# Patient Record
Sex: FEMALE | Race: WHITE | Employment: FULL TIME | URBAN - METROPOLITAN AREA
[De-identification: names, ages, dates, MRNs, and addresses within clinical notes are randomized per-mention and may not be internally consistent; named-entity substitution may affect disease eponyms.]

---

## 2022-06-20 ENCOUNTER — HOSPITAL ENCOUNTER (OUTPATIENT)
Dept: NON INVASIVE DIAGNOSTICS | Age: 47
Discharge: HOME | End: 2022-06-20
Payer: COMMERCIAL

## 2022-06-20 ENCOUNTER — APPOINTMENT (OUTPATIENT)
Dept: GENERAL RADIOLOGY | Age: 47
End: 2022-06-20
Attending: EMERGENCY MEDICINE
Payer: COMMERCIAL

## 2022-06-20 ENCOUNTER — TRANSCRIBE ORDER (OUTPATIENT)
Dept: REGISTRATION | Age: 47
End: 2022-06-20

## 2022-06-20 ENCOUNTER — HOSPITAL ENCOUNTER (EMERGENCY)
Age: 47
Discharge: HOME | End: 2022-06-20
Attending: EMERGENCY MEDICINE
Payer: COMMERCIAL

## 2022-06-20 VITALS
HEART RATE: 74 BPM | DIASTOLIC BLOOD PRESSURE: 76 MMHG | SYSTOLIC BLOOD PRESSURE: 121 MMHG | BODY MASS INDEX: 19.33 KG/M2 | OXYGEN SATURATION: 100 % | RESPIRATION RATE: 17 BRPM | TEMPERATURE: 98.4 F | HEIGHT: 65 IN | WEIGHT: 116 LBS

## 2022-06-20 DIAGNOSIS — R00.8 CANNONBALL PULSE: Primary | ICD-10-CM

## 2022-06-20 DIAGNOSIS — I44.1 MOBITZ TYPE 1 SECOND DEGREE ATRIOVENTRICULAR BLOCK: Primary | ICD-10-CM

## 2022-06-20 DIAGNOSIS — R00.8 OTHER ABNORMALITIES OF HEART BEAT: ICD-10-CM

## 2022-06-20 LAB
ALBUMIN SERPL-MCNC: 3.7 G/DL (ref 3.4–5)
ALBUMIN/GLOB SERPL: 1 {RATIO} (ref 0.8–1.7)
ALP SERPL-CCNC: 72 U/L (ref 45–117)
ALT SERPL-CCNC: 30 U/L (ref 13–56)
ANION GAP SERPL CALC-SCNC: 4 MMOL/L (ref 3–18)
AST SERPL-CCNC: 16 U/L (ref 10–38)
ATRIAL RATE: 70 BPM
BASOPHILS # BLD: 0.1 K/UL (ref 0–0.1)
BASOPHILS NFR BLD: 1 % (ref 0–2)
BILIRUB SERPL-MCNC: 0.5 MG/DL (ref 0.2–1)
BUN SERPL-MCNC: 22 MG/DL (ref 7–18)
BUN/CREAT SERPL: 22 (ref 12–20)
CALCIUM SERPL-MCNC: 9.2 MG/DL (ref 8.5–10.1)
CALCULATED P AXIS, ECG09: 80 DEGREES
CALCULATED R AXIS, ECG10: 76 DEGREES
CALCULATED T AXIS, ECG11: 72 DEGREES
CHLORIDE SERPL-SCNC: 107 MMOL/L (ref 100–111)
CO2 SERPL-SCNC: 29 MMOL/L (ref 21–32)
CREAT SERPL-MCNC: 0.98 MG/DL (ref 0.6–1.3)
DIAGNOSIS, 93000: NORMAL
DIFFERENTIAL METHOD BLD: ABNORMAL
EOSINOPHIL # BLD: 0.1 K/UL (ref 0–0.4)
EOSINOPHIL NFR BLD: 2 % (ref 0–5)
ERYTHROCYTE [DISTWIDTH] IN BLOOD BY AUTOMATED COUNT: 13.2 % (ref 11.6–14.5)
GLOBULIN SER CALC-MCNC: 3.7 G/DL (ref 2–4)
GLUCOSE SERPL-MCNC: 102 MG/DL (ref 74–99)
HCT VFR BLD AUTO: 42.3 % (ref 35–45)
HGB BLD-MCNC: 14.2 G/DL (ref 12–16)
IMM GRANULOCYTES # BLD AUTO: 0 K/UL (ref 0–0.04)
IMM GRANULOCYTES NFR BLD AUTO: 0 % (ref 0–0.5)
LYMPHOCYTES # BLD: 1 K/UL (ref 0.9–3.6)
LYMPHOCYTES NFR BLD: 17 % (ref 21–52)
MAGNESIUM SERPL-MCNC: 2.1 MG/DL (ref 1.6–2.6)
MCH RBC QN AUTO: 29.8 PG (ref 24–34)
MCHC RBC AUTO-ENTMCNC: 33.6 G/DL (ref 31–37)
MCV RBC AUTO: 88.9 FL (ref 78–100)
MONOCYTES # BLD: 0.4 K/UL (ref 0.05–1.2)
MONOCYTES NFR BLD: 6 % (ref 3–10)
NEUTS SEG # BLD: 4.2 K/UL (ref 1.8–8)
NEUTS SEG NFR BLD: 74 % (ref 40–73)
NRBC # BLD: 0 K/UL (ref 0–0.01)
NRBC BLD-RTO: 0 PER 100 WBC
PLATELET # BLD AUTO: 234 K/UL (ref 135–420)
PMV BLD AUTO: 9.3 FL (ref 9.2–11.8)
POTASSIUM SERPL-SCNC: 4.4 MMOL/L (ref 3.5–5.5)
PROT SERPL-MCNC: 7.4 G/DL (ref 6.4–8.2)
Q-T INTERVAL, ECG07: 422 MS
QRS DURATION, ECG06: 82 MS
QTC CALCULATION (BEZET), ECG08: 392 MS
RBC # BLD AUTO: 4.76 M/UL (ref 4.2–5.3)
SODIUM SERPL-SCNC: 140 MMOL/L (ref 136–145)
TROPONIN-HIGH SENSITIVITY: 4 NG/L (ref 0–54)
VENTRICULAR RATE, ECG03: 52 BPM
WBC # BLD AUTO: 5.7 K/UL (ref 4.6–13.2)

## 2022-06-20 PROCEDURE — 99285 EMERGENCY DEPT VISIT HI MDM: CPT

## 2022-06-20 PROCEDURE — 83735 ASSAY OF MAGNESIUM: CPT

## 2022-06-20 PROCEDURE — 93005 ELECTROCARDIOGRAM TRACING: CPT

## 2022-06-20 PROCEDURE — 80053 COMPREHEN METABOLIC PANEL: CPT

## 2022-06-20 PROCEDURE — 85025 COMPLETE CBC W/AUTO DIFF WBC: CPT

## 2022-06-20 PROCEDURE — 71045 X-RAY EXAM CHEST 1 VIEW: CPT

## 2022-06-20 PROCEDURE — 84484 ASSAY OF TROPONIN QUANT: CPT

## 2022-06-20 NOTE — PROGRESS NOTES
Ekg was performed on patient with Sinus  Rhythm 2nd degree av block Mobitz1 which was confirmed by Dr. Freedom Saba. Faxed a copy of the ekg to the ordering physician Dr. Hollis Ruiz office then called Martina Ruiz nurse for the day informed her of the reading. Dr. Isabel Puga was delivering a baby but she would inform him of the ekg and contact the patient accordingly.

## 2022-06-20 NOTE — DISCHARGE INSTRUCTIONS
It is recommended that you get outpatient testing for the Holter monitor, echocardiogram and stress test.  Please call Dr. Anthony Irby office first thing tomorrow morning to arrange for this testing. They will be expecting your call. Return to the ED for worsening symptoms, chest pain, shortness of breath or for other concerns.

## 2022-06-20 NOTE — ED PROVIDER NOTES
EMERGENCY DEPARTMENT HISTORY AND PHYSICAL EXAM    Date: 6/20/2022  Patient Name: Laverne Manuel    History of Presenting Illness     Chief Complaint   Patient presents with    Abnormal Lab Results       History Provided By: Patient and Patient's      History Ailyn Izquierdo):   2:53 PM  Laverne Manuel is a 49 y.o. female with no contributory PMHX who presents to the emergency department (room 15) C/O abnormal EKG onset this morning. Pt denies chest pain, shortness of breath, palpitations or any other sxs or complaints. Patient was seen by Dr. Beverly Vieira, [facility] on 17 June 2020. He heard an abnormal heartbeat and ordered an EKG for today. The patient had her EKG done this morning and got a call back to come into the ED. They told her she had a type of heart block. On my review of her EKG reading from this morning, she had a second-degree AV block (Mobitz type I). Patient states that she does CrossFit 4 times a week. She does have a family history of cardiac disease but not before 48 in men and not before 61 in women. Chief Complaint: abnormal EKG  Onset: Today  Timing:  Acute  Context: Symptoms started spontaneously, symptoms have not changed since onset  Location: Heart  Quality: Painless  Severity: Mild  Modifying Factors: Nothing makes it better, or worse. Associated Symptoms: denies any other associated signs or symptoms    PCP: None     Past History         Past Medical History:  No past medical history on file. Past Surgical History:  No past surgical history on file. Family History:  No family history on file. Reviewed and non-contributory    Social History:  Social History     Tobacco Use    Smoking status: Not on file    Smokeless tobacco: Not on file   Substance Use Topics    Alcohol use: Not on file    Drug use: Not on file       Medications: Allergies:   Allergies   Allergen Reactions    Pcn [Penicillins] Hives       Review of Systems      Review of Systems   Constitutional: Negative for chills and fever. HENT: Negative for congestion, rhinorrhea and sore throat. Eyes: Negative for pain and visual disturbance. Respiratory: Negative for cough, shortness of breath and wheezing. Cardiovascular: Negative for chest pain and palpitations. Gastrointestinal: Negative for abdominal pain, diarrhea and vomiting. Genitourinary: Negative for dysuria, flank pain, frequency and urgency. Musculoskeletal: Negative for arthralgias and myalgias. Skin: Negative for rash and wound. Neurological: Negative for speech difficulty, weakness, light-headedness and headaches. Psychiatric/Behavioral: Negative for agitation and confusion. All other systems reviewed and are negative. Physical Exam     Vitals:    06/20/22 1406 06/20/22 1503   BP: (!) 152/79    Pulse: 82 68   Resp: 15 11   Temp: 98.4 °F (36.9 °C)    SpO2: 99% 100%   Weight: 52.6 kg (116 lb)    Height: 5' 5\" (1.651 m)        Physical Exam  Vitals and nursing note reviewed. Constitutional:       General: She is not in acute distress. Appearance: Normal appearance. She is normal weight. She is not ill-appearing. HENT:      Head: Normocephalic and atraumatic. Nose: Nose normal. No rhinorrhea. Mouth/Throat:      Mouth: Mucous membranes are moist.      Pharynx: No oropharyngeal exudate or posterior oropharyngeal erythema. Eyes:      Extraocular Movements: Extraocular movements intact. Conjunctiva/sclera: Conjunctivae normal.      Pupils: Pupils are equal, round, and reactive to light. Cardiovascular:      Rate and Rhythm: Normal rate and regular rhythm. Heart sounds: Murmur heard. Systolic murmur is present with a grade of 2/6. No friction rub. No gallop. Comments: Patient states that she has had her murmur since childhood  Pulmonary:      Effort: Pulmonary effort is normal. No respiratory distress. Breath sounds: Normal breath sounds. No wheezing, rhonchi or rales.    Abdominal:      General: Bowel sounds are normal.      Palpations: Abdomen is soft. Tenderness: There is no abdominal tenderness. There is no guarding or rebound. Musculoskeletal:         General: No swelling, tenderness or deformity. Normal range of motion. Cervical back: Normal range of motion and neck supple. No rigidity. Lymphadenopathy:      Cervical: No cervical adenopathy. Skin:     General: Skin is warm and dry. Findings: No rash. Neurological:      General: No focal deficit present. Mental Status: She is alert and oriented to person, place, and time. Psychiatric:         Mood and Affect: Mood normal.         Behavior: Behavior normal.         Diagnostic Study Results     Labs -  Recent Results (from the past 12 hour(s))   EKG, 12 LEAD, INITIAL    Collection Time: 06/20/22 12:08 PM   Result Value Ref Range    Ventricular Rate 52 BPM    Atrial Rate 70 BPM    QRS Duration 82 ms    Q-T Interval 422 ms    QTC Calculation (Bezet) 392 ms    Calculated P Axis 80 degrees    Calculated R Axis 76 degrees    Calculated T Axis 72 degrees    Diagnosis         Sinus rhythm with 2nd degree AV block (Mobitz I)  Cannot rule out Anterior infarct , age undetermined  Abnormal ECG  No previous ECGs available  Confirmed by Shayna Dominguez MD. ([id number]) on 6/20/2022 12:31:32 PM     EKG, 12 LEAD, INITIAL    Collection Time: 06/20/22  2:27 PM   Result Value Ref Range    Ventricular Rate 81 BPM    Atrial Rate 81 BPM    P-R Interval 188 ms    QRS Duration 80 ms    Q-T Interval 386 ms    QTC Calculation (Bezet) 448 ms    Calculated P Axis 77 degrees    Calculated R Axis 60 degrees    Calculated T Axis 58 degrees    Diagnosis       Normal sinus rhythm  Normal ECG  When compared with ECG of 20-JUN-2022 12:08,  Sinus rhythm is no longer with 2nd degree AV block (Mobitz I)  Vent.  rate has increased BY  29 BPM  QT has lengthened     CBC WITH AUTOMATED DIFF    Collection Time: 06/20/22  3:18 PM   Result Value Ref Range    WBC 5.7 4.6 - 13.2 K/uL    RBC 4.76 4.20 - 5.30 M/uL    HGB 14.2 12.0 - 16.0 g/dL    HCT 42.3 35.0 - 45.0 %    MCV 88.9 78.0 - 100.0 FL    MCH 29.8 24.0 - 34.0 PG    MCHC 33.6 31.0 - 37.0 g/dL    RDW 13.2 11.6 - 14.5 %    PLATELET 749 957 - 244 K/uL    MPV 9.3 9.2 - 11.8 FL    NRBC 0.0 0  WBC    ABSOLUTE NRBC 0.00 0.00 - 0.01 K/uL    NEUTROPHILS 74 (H) 40 - 73 %    LYMPHOCYTES 17 (L) 21 - 52 %    MONOCYTES 6 3 - 10 %    EOSINOPHILS 2 0 - 5 %    BASOPHILS 1 0 - 2 %    IMMATURE GRANULOCYTES 0 0.0 - 0.5 %    ABS. NEUTROPHILS 4.2 1.8 - 8.0 K/UL    ABS. LYMPHOCYTES 1.0 0.9 - 3.6 K/UL    ABS. MONOCYTES 0.4 0.05 - 1.2 K/UL    ABS. EOSINOPHILS 0.1 0.0 - 0.4 K/UL    ABS. BASOPHILS 0.1 0.0 - 0.1 K/UL    ABS. IMM. GRANS. 0.0 0.00 - 0.04 K/UL    DF AUTOMATED     TROPONIN-HIGH SENSITIVITY    Collection Time: 06/20/22  3:18 PM   Result Value Ref Range    Troponin-High Sensitivity 4 0 - 54 ng/L   METABOLIC PANEL, COMPREHENSIVE    Collection Time: 06/20/22  3:18 PM   Result Value Ref Range    Sodium 140 136 - 145 mmol/L    Potassium 4.4 3.5 - 5.5 mmol/L    Chloride 107 100 - 111 mmol/L    CO2 29 21 - 32 mmol/L    Anion gap 4 3.0 - 18 mmol/L    Glucose 102 (H) 74 - 99 mg/dL    BUN 22 (H) 7.0 - 18 MG/DL    Creatinine 0.98 0.6 - 1.3 MG/DL    BUN/Creatinine ratio 22 (H) 12 - 20      GFR est AA >60 >60 ml/min/1.73m2    GFR est non-AA >60 >60 ml/min/1.73m2    Calcium 9.2 8.5 - 10.1 MG/DL    Bilirubin, total 0.5 0.2 - 1.0 MG/DL    ALT (SGPT) 30 13 - 56 U/L    AST (SGOT) 16 10 - 38 U/L    Alk.  phosphatase 72 45 - 117 U/L    Protein, total 7.4 6.4 - 8.2 g/dL    Albumin 3.7 3.4 - 5.0 g/dL    Globulin 3.7 2.0 - 4.0 g/dL    A-G Ratio 1.0 0.8 - 1.7     MAGNESIUM    Collection Time: 06/20/22  3:18 PM   Result Value Ref Range    Magnesium 2.1 1.6 - 2.6 mg/dL       Radiologic Studies -   XR CHEST PORT   Final Result      No acute findings in the chest.         CT Results  (Last 48 hours)    None        CXR Results  (Last 48 hours)               06/20/22 1543  XR CHEST PORT Final result    Impression:      No acute findings in the chest.        Narrative:  EXAM: XR CHEST PORT       CLINICAL INDICATION/HISTORY: abnormal ekg   -Additional: None       COMPARISON: None       TECHNIQUE: Frontal view of the chest       _______________       FINDINGS:       HEART AND MEDIASTINUM: Normal cardiac size and mediastinal contours. LUNGS AND PLEURAL SPACES: No focal pneumonic consolidation, pneumothorax, or   pleural effusion. BONY THORAX AND SOFT TISSUES: No acute osseous abnormality       _______________                 Medications given in the ED-  Medications - No data to display    Procedures     Procedures    ED Course     I Margoth Haider MD) am the first provider for this patient. I reviewed the vital signs, available nursing notes, past medical history, past surgical history, family history and social history. Records Reviewed: Nursing Notes    Cardiac Monitor:  Rate: 82 bpm  Rhythm: sinus rhythm    Pulse Oximetry Analysis - 99% on RA    EKG interpretation: (Preliminary)  Rhythm: NSR. Rate: 81 bpm; no STEMI  EKG read by Kennedy Pino MD at 2:27 PM, reviewed by Margoth Haider MD at 2:54 PM    2:53 PM Initial assessment performed. The patients presenting problems have been discussed, and they are in agreement with the care plan formulated and outlined with them. I have encouraged them to ask questions as they arise throughout their visit. ED Course as of 06/20/22 1702 Mon Jun 20, 2022 1655 Discussed with Dr. Leonie Brown. As patient is very active and asymptomatic, she can get outpatient testing with Holter, echo, stress test.  She should call the office tomorrow for follow-up.  [JM]      ED Course User Index  [JM] Nathaniel Araujo MD         Medical Decision Making     Provider Notes (Medical Decision Making):   DDX: Arrhythmia, erroneous test, electrolyte disorder    Discussion:  49 y.o. female who is fit and active with asymptomatic second-degree AV block, Mobitz type I. Discussed with cardiology who recommends outpatient follow-up for further testing. Discussed with patient and family who understand and agree with this plan. Diagnosis and Disposition     DISCHARGE NOTE:  5:02 PM   Gregory Hogan  results have been reviewed with her. She has been counseled regarding her diagnosis, treatment, and plan. She verbally conveys understanding and agreement of the signs, symptoms, diagnosis, treatment and prognosis and additionally agrees to follow up as discussed. She also agrees with the care-plan and conveys that all of her questions have been answered. I have also provided discharge instructions for her that include: educational information regarding their diagnosis and treatment, and list of reasons why they would want to return to the ED prior to their follow-up appointment, should her condition change. She has been provided with education for proper emergency department utilization. CLINICAL IMPRESSION:    1. Mobitz type 1 second degree atrioventricular block        PLAN:  1. D/C Home  2. There are no discharge medications for this patient. 3.   Follow-up Information     Follow up With Specialties Details Why Constanza Garza MD Cardiovascular Disease Physician, Internal Medicine Physician Schedule an appointment as soon as possible for a visit  As soon as possible, For follow up from Emergency Department visit. [address] [place] [address]   [phone]      [facility] EMERGENCY DEPT Emergency Medicine  As needed; If symptoms worsen [address] [place] [id number] [facility] Kristyn He MD am the primary clinician of record. Parris Disclaimer     Please note that this dictation was completed with [employer], the computer voice recognition software.   Quite often unanticipated grammatical, syntax, homophones, and other interpretive errors are inadvertently transcribed by the computer software. Please disregard these errors. Please excuse any errors that have escaped final proofreading.     Alondra Jordan MD

## 2022-06-20 NOTE — PROGRESS NOTES
Patient have intermittent asymptomatic second-degree Mobitz 1 AV block. Repeat EKG is normal.  Discussed with Dr. Balwinder Montes. Patient have no symptoms she is physically active. Patient can have outpatient cardiac testing and follow-up.   Thanks

## 2022-06-20 NOTE — ED TRIAGE NOTES
Pt presents from outpt clinic for abn EKG  Irregular HR was noticed by Dr. Renee Espana and ekg/labs were done  Today, EKG was noted to have a 2nd degree heart block   In triage, pt denies having medical history, or having any symptoms.  Does crossfit 4x/week  + Family history of cardiac disease

## 2022-06-21 LAB
ATRIAL RATE: 81 BPM
CALCULATED P AXIS, ECG09: 77 DEGREES
CALCULATED R AXIS, ECG10: 60 DEGREES
CALCULATED T AXIS, ECG11: 58 DEGREES
DIAGNOSIS, 93000: NORMAL
P-R INTERVAL, ECG05: 188 MS
Q-T INTERVAL, ECG07: 386 MS
QRS DURATION, ECG06: 80 MS
QTC CALCULATION (BEZET), ECG08: 448 MS
VENTRICULAR RATE, ECG03: 81 BPM